# Patient Record
Sex: MALE | Race: WHITE | NOT HISPANIC OR LATINO | Employment: FULL TIME | RURAL
[De-identification: names, ages, dates, MRNs, and addresses within clinical notes are randomized per-mention and may not be internally consistent; named-entity substitution may affect disease eponyms.]

---

## 2018-12-14 ENCOUNTER — OFFICE VISIT (OUTPATIENT)
Dept: OTOLARYNGOLOGY | Facility: CLINIC | Age: 33
End: 2018-12-14

## 2018-12-14 VITALS — HEIGHT: 68 IN | OXYGEN SATURATION: 98 % | WEIGHT: 167 LBS | BODY MASS INDEX: 25.31 KG/M2

## 2018-12-14 DIAGNOSIS — J34.2 NASAL SEPTAL DEFORMITY: Primary | ICD-10-CM

## 2018-12-14 PROCEDURE — 31231 NASAL ENDOSCOPY DX: CPT | Performed by: OTOLARYNGOLOGY

## 2018-12-14 PROCEDURE — 99203 OFFICE O/P NEW LOW 30 MIN: CPT | Performed by: OTOLARYNGOLOGY

## 2018-12-14 RX ORDER — CITALOPRAM 20 MG/1
20 TABLET ORAL DAILY
Status: ON HOLD | COMMUNITY
End: 2022-03-16

## 2018-12-17 NOTE — PROGRESS NOTES
Subjective   Micah Antoine is a 33 y.o. male.     History of Present Illness   She is here for evaluation of chronic nasal obstruction.  States symptoms been present for greater than 10 years.  Seems to be worse on the right than the left.  Symptoms are also worse at night.  Doesn't have a lot of trouble with rhinorrhea.  No previous nasal surgery or injury.  Nothing makes this any better.      The following portions of the patient's history were reviewed and updated as appropriate: allergies, current medications, past family history, past medical history, past social history, past surgical history and problem list.      Micah Antoine reports that  has never smoked. he has never used smokeless tobacco.  Patient is not a tobacco user and has not been counseled for use of tobacco products    Family History   Problem Relation Age of Onset   • Cancer Mother    • Cancer Sister    • Cancer Maternal Grandmother    • Cancer Paternal Grandfather        No Known Allergies      Current Outpatient Medications:   •  citalopram (CeleXA) 20 MG tablet, Take 20 mg by mouth Daily., Disp: , Rfl:     No past medical history on file.  Denies hypertension, coronary artery disease, diabetes    Review of Systems   HENT: Positive for sore throat.    Respiratory: Positive for shortness of breath.    All other systems reviewed and are negative.          Objective   Physical Exam  General: Well-developed well-nourished male in no acute distress.  Alert and oriented ×3. Head: Normocephalic. Face: Symmetrical strength and appearance. PERRL. EOMI. Voice:Strong. Speech:Fluent  Ears: External ears no deformity, canals no discharge, tympanic membranes intact clear and mobile bilaterally.  Nose: Nares show no discharge mass polyp or purulence.  Nasal tip is rotated slightly to the left.  Nasal septum is markedly to the right obstructing essentially 100% of the nasal airway at the level of the inferior turbinate on the right.    Oral cavity: Lips and gums  without lesions.  Tongue and floor of mouth without lesions.  Parotid and submandibular ducts unobstructed.  No mucosal lesions on the buccal mucosa or vestibule of the mouth.  Pharynx: No erythema exudate mass or ulcer  Neck: No lymphadenopathy.  No thyromegaly.  Trachea and larynx midline.  No masses in the parotid or submandibular glands.    Nasal endoscopy is performed: Vinnie-Synephrine and Xylocaine are instilled the nares bilaterally.  0° scope is passed into each nostril.  The inferior, middle, and superior turbinates as well as nasal septum and nasopharynx are examined.  Pertinent findings include: Marked septal deformity to the right inferiorly impacting on the inferior turbinate and obstructing 100% of the nasal airway on the right.  Both nasal passages show no evidence of polyp or other obstructing mass.  Nasopharynx is without mass.    Assessment/Plan   Micah was seen today for deviated septum.    Diagnoses and all orders for this visit:    Nasal septal deformity      Plan: Offered to perform nasal septoplasty.  Explained the nature of the procedure to the patient in layman's terms including need for general anesthetic, and risks including bleeding, infection, poor healing, poor appearance, hole in the nasal septum, numbness of the front teeth and palate, as well as the possibility of failure to improve symptoms and possible need for medical therapy after surgery to maximize symptom improvement.  Proposed benefit would be improved nasal airflow.  The alternative would be observation.  Also discussed the possibility of seeing a surgeon who performs rhinoplasty if he wants to do something about the appearance of his nose, but he declines.  Patient voices understanding of all the above and wishes to proceed.  Upon review of the patient's referral from the [facility], the effective dates of his referral.  In on December 22.  I explained to the patient that he would need to obtain a new  authorization for additional treatment and once he is obtained this he can call to schedule his surgery and a preop history and physical.

## 2019-02-22 ENCOUNTER — OFFICE VISIT (OUTPATIENT)
Dept: OTOLARYNGOLOGY | Facility: CLINIC | Age: 34
End: 2019-02-22

## 2019-02-22 VITALS — BODY MASS INDEX: 25.91 KG/M2 | HEIGHT: 68 IN | WEIGHT: 171 LBS | OXYGEN SATURATION: 98 %

## 2019-02-22 DIAGNOSIS — J34.2 NASAL SEPTAL DEFORMITY: Primary | ICD-10-CM

## 2019-02-22 PROCEDURE — 99213 OFFICE O/P EST LOW 20 MIN: CPT | Performed by: OTOLARYNGOLOGY

## 2019-02-22 NOTE — PROGRESS NOTES
Subjective   Micah Antoine is a 33 y.o. male.     History of Present Illness     Patient was seen previously with long-standing nasal obstruction and a marked nasal septal deformity.  Septoplasty was discussed, but his [facility] authorization for care was going to  before this could be scheduled.  Returns today having had his period of coverage extended.  Continues to have daily symptoms of nasal obstruction.New patients  No purulent rhinorrhea.    The following portions of the patient's history were reviewed and updated as appropriate: allergies, current medications, past family history, past medical history, past social history, past surgical history and problem list.      Micah Antoine reports that  has never smoked. he has never used smokeless tobacco.  Patient is not a tobacco user and has not been counseled for use of tobacco products    Family History   Problem Relation Age of Onset   • Cancer Mother    • Cancer Sister    • Cancer Maternal Grandmother    • Cancer Paternal Grandfather        No Known Allergies      Current Outpatient Medications:   •  citalopram (CeleXA) 20 MG tablet, Take 20 mg by mouth Daily., Disp: , Rfl:     No past medical history on file.  Denies hypertension, coronary artery disease, diabetes    Review of Systems   Constitutional: Negative for fever.   HENT: Positive for congestion.    Respiratory: Negative for cough.            Objective   Physical Exam  General: Well-developed well-nourished male in no acute distress.  Alert and oriented x3. Head: Normocephalic. Face: Symmetrical strength and appearance. PERRL. EOMI. Voice:Strong. Speech:Fluent  Ears: External ears no deformity, canals no discharge, tympanic membranes intact clear and mobile bilaterally.  Nose: Nares show no discharge mass polyp or purulence.  Boggy mucosa is present.  Tip slightly rotated to the left septum: Markedly angulated deformity to the right obstructing essentially 100% of the nasal airway at the level of the inferior  turbinate on the right  Oral cavity: Lips and gums without lesions.  Tongue and floor of mouth without lesions.  Parotid and submandibular ducts unobstructed.  No mucosal lesions on the buccal mucosa or vestibule of the mouth.  Pharynx: No erythema exudate mass or ulcer  Neck: No lymphadenopathy.  No thyromegaly.  Trachea and larynx midline.  No masses in the parotid or submandibular glands.  Chest: Clear.  Heart: Regular.  Abdomen: Benign.        Assessment/Plan   Micah was seen today for follow-up.    Diagnoses and all orders for this visit:    Nasal septal deformity      Plan: Offered to perform nasal septoplasty.  Explained the nature of the procedure to the patient in layman's terms including need for general anesthetic, and risks including bleeding, infection, poor healing, poor appearance, hole in the nasal septum, numbness of the front teeth and palate, as well as the possibility of failure to improve symptoms and possible need for medical therapy after surgery to maximize symptom improvement.  Proposed benefit would be improved nasal airflow.  The alternative would be observation.  Patient voices understanding of all the above and wishes to proceed.  This is scheduled.

## 2019-02-25 ENCOUNTER — PREP FOR SURGERY (OUTPATIENT)
Dept: OTHER | Facility: HOSPITAL | Age: 34
End: 2019-02-25

## 2019-02-25 DIAGNOSIS — J34.2 DEVIATED NASAL SEPTUM: Primary | ICD-10-CM

## 2019-02-25 RX ORDER — OXYMETAZOLINE HYDROCHLORIDE 0.05 G/100ML
3 SPRAY NASAL
Status: CANCELLED | OUTPATIENT
Start: 2019-03-18 | End: 2019-03-20

## 2022-01-21 ENCOUNTER — OFFICE VISIT (OUTPATIENT)
Dept: OTOLARYNGOLOGY | Facility: CLINIC | Age: 37
End: 2022-01-21

## 2022-01-21 VITALS — HEIGHT: 68 IN | OXYGEN SATURATION: 98 % | WEIGHT: 177 LBS | HEART RATE: 68 BPM | BODY MASS INDEX: 26.83 KG/M2

## 2022-01-21 DIAGNOSIS — J34.2 NASAL SEPTAL DEFORMITY: Primary | ICD-10-CM

## 2022-01-21 PROCEDURE — 99214 OFFICE O/P EST MOD 30 MIN: CPT | Performed by: OTOLARYNGOLOGY

## 2022-01-21 RX ORDER — OXYMETAZOLINE HYDROCHLORIDE 0.05 G/100ML
2 SPRAY NASAL
Status: CANCELLED | OUTPATIENT
Start: 2022-03-16 | End: 2022-03-19

## 2022-01-21 NOTE — PROGRESS NOTES
Subjective   Micah Antoine is a 36 y.o. male.     History of Present Illness   Patient is known to me from previous evaluation back in 2019. Has longstanding nasal obstruction and marked nasal septal deformity. Septoplasty was discussed and scheduled but for reasons not entirely clear he never was able to proceed with surgery. Returns today stating he continues to have daily symptoms of nasal obstruction and would like to be reevaluated for surgery. Is not having any purulent rhinorrhea. Previously had nasal endoscopy that did not reveal any additional pathology beyond the septal deformity that was contributing to his symptoms.      The following portions of the patient's history were reviewed and updated as appropriate: allergies, current medications, past family history, past medical history, past social history, past surgical history and problem list.      Micah Antoine reports that he has never smoked. He has never used smokeless tobacco.  Patient is not a tobacco user and has not been counseled for use of tobacco products    Family History   Problem Relation Age of Onset   • Cancer Mother    • Cancer Sister    • Cancer Maternal Grandmother    • Cancer Paternal Grandfather        No Known Allergies      Current Outpatient Medications:   •  citalopram (CeleXA) 20 MG tablet, Take 20 mg by mouth Daily., Disp: , Rfl:     No past medical history on file.    Past Surgical History:   Procedure Laterality Date   • EYE SURGERY           Review of Systems   Constitutional: Negative for fever.   HENT: Positive for congestion.            Objective   Physical Exam  Ears: External ears no deformity, canals no discharge, tympanic membranes intact clear and mobile bilaterally.  Nares: Markedly angulated septal deformity to the right impacting on the inferior turbinate and obstructing 100% of the nasal airway at this level. No polyp or purulence seen on anterior rhinoscopy.  Oral cavity: No masses or lesions  Pharynx: No erythema or  exudate  Neck: No adenopathy or mass        Assessment/Plan   Diagnoses and all orders for this visit:    1. Nasal septal deformity (Primary)  -     Case Request; Standing  -     COVID PRE-OP / PRE-PROCEDURE SCREENING ORDER (NO ISOLATION) - Swab, Nasopharynx; Future  -     Case Request    Other orders  -     Follow Anesthesia Guidelines / Standing Orders; Future  -     Obtain Informed Consent; Future      Plan: Offered to perform nasal septoplasty.  Explained the nature of the procedure to the patient in layman's terms including need for general anesthetic, and risks including bleeding, infection, poor healing, poor appearance, hole in the nasal septum, numbness of the front teeth and palate, as well as the possibility of failure to improve symptoms and possible need for medical therapy after surgery to maximize symptom improvement.  Proposed benefit would be improved nasal airflow.  The alternative would be observation.  Patient voices understanding of all the above and wishes to proceed.  This is scheduled.

## 2022-02-25 ENCOUNTER — APPOINTMENT (OUTPATIENT)
Dept: PREADMISSION TESTING | Facility: HOSPITAL | Age: 37
End: 2022-02-25

## 2022-03-14 ENCOUNTER — PRE-ADMISSION TESTING (OUTPATIENT)
Dept: PREADMISSION TESTING | Facility: HOSPITAL | Age: 37
End: 2022-03-14

## 2022-03-14 ENCOUNTER — LAB (OUTPATIENT)
Dept: LAB | Facility: HOSPITAL | Age: 37
End: 2022-03-14

## 2022-03-14 VITALS
OXYGEN SATURATION: 98 % | HEART RATE: 78 BPM | RESPIRATION RATE: 16 BRPM | DIASTOLIC BLOOD PRESSURE: 74 MMHG | WEIGHT: 176 LBS | HEIGHT: 68 IN | BODY MASS INDEX: 26.67 KG/M2 | SYSTOLIC BLOOD PRESSURE: 136 MMHG

## 2022-03-14 DIAGNOSIS — J34.2 NASAL SEPTAL DEFORMITY: ICD-10-CM

## 2022-03-14 LAB — SARS-COV-2 N GENE RESP QL NAA+PROBE: NOT DETECTED

## 2022-03-14 PROCEDURE — 87635 SARS-COV-2 COVID-19 AMP PRB: CPT

## 2022-03-14 PROCEDURE — C9803 HOPD COVID-19 SPEC COLLECT: HCPCS

## 2022-03-15 ENCOUNTER — ANESTHESIA EVENT (OUTPATIENT)
Dept: PERIOP | Facility: HOSPITAL | Age: 37
End: 2022-03-15

## 2022-03-16 ENCOUNTER — ANESTHESIA (OUTPATIENT)
Dept: PERIOP | Facility: HOSPITAL | Age: 37
End: 2022-03-16

## 2022-03-16 ENCOUNTER — HOSPITAL ENCOUNTER (OUTPATIENT)
Facility: HOSPITAL | Age: 37
Setting detail: HOSPITAL OUTPATIENT SURGERY
Discharge: HOME | End: 2022-03-16
Attending: OTOLARYNGOLOGY | Admitting: OTOLARYNGOLOGY

## 2022-03-16 VITALS
HEIGHT: 68 IN | WEIGHT: 175.93 LBS | SYSTOLIC BLOOD PRESSURE: 158 MMHG | DIASTOLIC BLOOD PRESSURE: 98 MMHG | HEART RATE: 70 BPM | TEMPERATURE: 96.9 F | BODY MASS INDEX: 26.66 KG/M2 | RESPIRATION RATE: 18 BRPM | OXYGEN SATURATION: 100 %

## 2022-03-16 DIAGNOSIS — J34.2 NASAL SEPTAL DEFORMITY: ICD-10-CM

## 2022-03-16 PROCEDURE — 88311 DECALCIFY TISSUE: CPT

## 2022-03-16 PROCEDURE — 25010000002 FENTANYL CITRATE (PF) 50 MCG/ML SOLUTION: Performed by: NURSE ANESTHETIST, CERTIFIED REGISTERED

## 2022-03-16 PROCEDURE — 25010000002 SUCCINYLCHOLINE PER 20 MG: Performed by: NURSE ANESTHETIST, CERTIFIED REGISTERED

## 2022-03-16 PROCEDURE — 25010000002 MIDAZOLAM PER 1 MG: Performed by: NURSE ANESTHETIST, CERTIFIED REGISTERED

## 2022-03-16 PROCEDURE — 25010000002 DEXAMETHASONE PER 1 MG: Performed by: NURSE ANESTHETIST, CERTIFIED REGISTERED

## 2022-03-16 PROCEDURE — 25010000002 PROPOFOL 10 MG/ML EMULSION: Performed by: NURSE ANESTHETIST, CERTIFIED REGISTERED

## 2022-03-16 PROCEDURE — 88304 TISSUE EXAM BY PATHOLOGIST: CPT

## 2022-03-16 PROCEDURE — 25010000002 ONDANSETRON PER 1 MG: Performed by: NURSE ANESTHETIST, CERTIFIED REGISTERED

## 2022-03-16 PROCEDURE — 30520 REPAIR OF NASAL SEPTUM: CPT | Performed by: OTOLARYNGOLOGY

## 2022-03-16 RX ORDER — MULTIPLE VITAMINS W/ MINERALS TAB 9MG-400MCG
1 TAB ORAL DAILY
COMMUNITY

## 2022-03-16 RX ORDER — LIDOCAINE HYDROCHLORIDE AND EPINEPHRINE BITARTRATE 20; .01 MG/ML; MG/ML
INJECTION, SOLUTION SUBCUTANEOUS AS NEEDED
Status: DISCONTINUED | OUTPATIENT
Start: 2022-03-16 | End: 2022-03-16 | Stop reason: HOSPADM

## 2022-03-16 RX ORDER — NALOXONE HCL 0.4 MG/ML
0.4 VIAL (ML) INJECTION AS NEEDED
Status: DISCONTINUED | OUTPATIENT
Start: 2022-03-16 | End: 2022-03-16 | Stop reason: HOSPADM

## 2022-03-16 RX ORDER — PROMETHAZINE HYDROCHLORIDE 25 MG/1
25 TABLET ORAL ONCE AS NEEDED
Status: DISCONTINUED | OUTPATIENT
Start: 2022-03-16 | End: 2022-03-16 | Stop reason: HOSPADM

## 2022-03-16 RX ORDER — OXYMETAZOLINE HYDROCHLORIDE 0.05 G/100ML
2 SPRAY NASAL
Status: DISCONTINUED | OUTPATIENT
Start: 2022-03-16 | End: 2022-03-16 | Stop reason: HOSPADM

## 2022-03-16 RX ORDER — MIDAZOLAM HYDROCHLORIDE 1 MG/ML
INJECTION INTRAMUSCULAR; INTRAVENOUS AS NEEDED
Status: DISCONTINUED | OUTPATIENT
Start: 2022-03-16 | End: 2022-03-16 | Stop reason: SURG

## 2022-03-16 RX ORDER — SODIUM CHLORIDE, SODIUM GLUCONATE, SODIUM ACETATE, POTASSIUM CHLORIDE AND MAGNESIUM CHLORIDE 526; 502; 368; 37; 30 MG/100ML; MG/100ML; MG/100ML; MG/100ML; MG/100ML
1000 INJECTION, SOLUTION INTRAVENOUS CONTINUOUS PRN
Status: DISCONTINUED | OUTPATIENT
Start: 2022-03-16 | End: 2022-03-16 | Stop reason: HOSPADM

## 2022-03-16 RX ORDER — PROMETHAZINE HYDROCHLORIDE 25 MG/1
25 SUPPOSITORY RECTAL ONCE AS NEEDED
Status: DISCONTINUED | OUTPATIENT
Start: 2022-03-16 | End: 2022-03-16 | Stop reason: HOSPADM

## 2022-03-16 RX ORDER — ACETAMINOPHEN 650 MG/1
650 SUPPOSITORY RECTAL ONCE AS NEEDED
Status: DISCONTINUED | OUTPATIENT
Start: 2022-03-16 | End: 2022-03-16 | Stop reason: HOSPADM

## 2022-03-16 RX ORDER — ROCURONIUM BROMIDE 10 MG/ML
INJECTION, SOLUTION INTRAVENOUS AS NEEDED
Status: DISCONTINUED | OUTPATIENT
Start: 2022-03-16 | End: 2022-03-16 | Stop reason: SURG

## 2022-03-16 RX ORDER — ONDANSETRON 2 MG/ML
INJECTION INTRAMUSCULAR; INTRAVENOUS AS NEEDED
Status: DISCONTINUED | OUTPATIENT
Start: 2022-03-16 | End: 2022-03-16 | Stop reason: SURG

## 2022-03-16 RX ORDER — SUCCINYLCHOLINE CHLORIDE 20 MG/ML
INJECTION INTRAMUSCULAR; INTRAVENOUS AS NEEDED
Status: DISCONTINUED | OUTPATIENT
Start: 2022-03-16 | End: 2022-03-16 | Stop reason: SURG

## 2022-03-16 RX ORDER — ONDANSETRON 2 MG/ML
4 INJECTION INTRAMUSCULAR; INTRAVENOUS ONCE AS NEEDED
Status: DISCONTINUED | OUTPATIENT
Start: 2022-03-16 | End: 2022-03-16 | Stop reason: HOSPADM

## 2022-03-16 RX ORDER — OXYCODONE HYDROCHLORIDE AND ACETAMINOPHEN 5; 325 MG/1; MG/1
1 TABLET ORAL ONCE AS NEEDED
Status: DISCONTINUED | OUTPATIENT
Start: 2022-03-16 | End: 2022-03-16 | Stop reason: HOSPADM

## 2022-03-16 RX ORDER — DEXAMETHASONE SODIUM PHOSPHATE 4 MG/ML
INJECTION, SOLUTION INTRA-ARTICULAR; INTRALESIONAL; INTRAMUSCULAR; INTRAVENOUS; SOFT TISSUE AS NEEDED
Status: DISCONTINUED | OUTPATIENT
Start: 2022-03-16 | End: 2022-03-16 | Stop reason: SURG

## 2022-03-16 RX ORDER — FLUMAZENIL 0.1 MG/ML
0.2 INJECTION INTRAVENOUS AS NEEDED
Status: DISCONTINUED | OUTPATIENT
Start: 2022-03-16 | End: 2022-03-16 | Stop reason: HOSPADM

## 2022-03-16 RX ORDER — OXYMETAZOLINE HYDROCHLORIDE 0.05 G/100ML
SPRAY NASAL AS NEEDED
Status: DISCONTINUED | OUTPATIENT
Start: 2022-03-16 | End: 2022-03-16 | Stop reason: HOSPADM

## 2022-03-16 RX ORDER — DIPHENHYDRAMINE HYDROCHLORIDE 50 MG/ML
12.5 INJECTION INTRAMUSCULAR; INTRAVENOUS
Status: DISCONTINUED | OUTPATIENT
Start: 2022-03-16 | End: 2022-03-16 | Stop reason: HOSPADM

## 2022-03-16 RX ORDER — ONDANSETRON 4 MG/1
4 TABLET, ORALLY DISINTEGRATING ORAL EVERY 8 HOURS PRN
Qty: 10 TABLET | Refills: 1 | Status: SHIPPED | OUTPATIENT
Start: 2022-03-16 | End: 2022-03-29

## 2022-03-16 RX ORDER — EPHEDRINE SULFATE 50 MG/ML
5 INJECTION, SOLUTION INTRAVENOUS ONCE AS NEEDED
Status: DISCONTINUED | OUTPATIENT
Start: 2022-03-16 | End: 2022-03-16 | Stop reason: HOSPADM

## 2022-03-16 RX ORDER — CEFUROXIME AXETIL 250 MG/1
250 TABLET ORAL 2 TIMES DAILY
Qty: 14 TABLET | Refills: 0 | Status: SHIPPED | OUTPATIENT
Start: 2022-03-16 | End: 2022-03-29

## 2022-03-16 RX ORDER — PROPOFOL 10 MG/ML
VIAL (ML) INTRAVENOUS AS NEEDED
Status: DISCONTINUED | OUTPATIENT
Start: 2022-03-16 | End: 2022-03-16 | Stop reason: SURG

## 2022-03-16 RX ORDER — SCOLOPAMINE TRANSDERMAL SYSTEM 1 MG/1
1 PATCH, EXTENDED RELEASE TRANSDERMAL CONTINUOUS
Status: DISCONTINUED | OUTPATIENT
Start: 2022-03-16 | End: 2022-03-16 | Stop reason: HOSPADM

## 2022-03-16 RX ORDER — LIDOCAINE HYDROCHLORIDE 20 MG/ML
INJECTION, SOLUTION INFILTRATION; PERINEURAL AS NEEDED
Status: DISCONTINUED | OUTPATIENT
Start: 2022-03-16 | End: 2022-03-16 | Stop reason: SURG

## 2022-03-16 RX ORDER — MEPERIDINE HYDROCHLORIDE 25 MG/ML
12.5 INJECTION INTRAMUSCULAR; INTRAVENOUS; SUBCUTANEOUS
Status: DISCONTINUED | OUTPATIENT
Start: 2022-03-16 | End: 2022-03-16 | Stop reason: HOSPADM

## 2022-03-16 RX ORDER — OXYCODONE HYDROCHLORIDE AND ACETAMINOPHEN 5; 325 MG/1; MG/1
1 TABLET ORAL EVERY 6 HOURS PRN
Qty: 30 TABLET | Refills: 0 | Status: SHIPPED | OUTPATIENT
Start: 2022-03-16 | End: 2022-03-29

## 2022-03-16 RX ORDER — ACETAMINOPHEN 325 MG/1
650 TABLET ORAL ONCE AS NEEDED
Status: DISCONTINUED | OUTPATIENT
Start: 2022-03-16 | End: 2022-03-16 | Stop reason: HOSPADM

## 2022-03-16 RX ORDER — FENTANYL CITRATE 50 UG/ML
INJECTION, SOLUTION INTRAMUSCULAR; INTRAVENOUS AS NEEDED
Status: DISCONTINUED | OUTPATIENT
Start: 2022-03-16 | End: 2022-03-16 | Stop reason: SURG

## 2022-03-16 RX ADMIN — LIDOCAINE HYDROCHLORIDE 80 MG: 20 INJECTION, SOLUTION INFILTRATION; PERINEURAL at 11:19

## 2022-03-16 RX ADMIN — SODIUM CHLORIDE, SODIUM GLUCONATE, SODIUM ACETATE, POTASSIUM CHLORIDE AND MAGNESIUM CHLORIDE 1000 ML: 526; 502; 368; 37; 30 INJECTION, SOLUTION INTRAVENOUS at 09:34

## 2022-03-16 RX ADMIN — MIDAZOLAM HYDROCHLORIDE 2 MG: 1 INJECTION, SOLUTION INTRAMUSCULAR; INTRAVENOUS at 11:15

## 2022-03-16 RX ADMIN — SODIUM CHLORIDE, SODIUM GLUCONATE, SODIUM ACETATE, POTASSIUM CHLORIDE AND MAGNESIUM CHLORIDE 1000 ML: 526; 502; 368; 37; 30 INJECTION, SOLUTION INTRAVENOUS at 13:06

## 2022-03-16 RX ADMIN — ROCURONIUM BROMIDE 5 MG: 10 INJECTION INTRAVENOUS at 11:19

## 2022-03-16 RX ADMIN — SCOLOPAMINE TRANSDERMAL SYSTEM 1 PATCH: 1 PATCH, EXTENDED RELEASE TRANSDERMAL at 09:35

## 2022-03-16 RX ADMIN — DEXAMETHASONE SODIUM PHOSPHATE 4 MG: 4 INJECTION, SOLUTION INTRAMUSCULAR; INTRAVENOUS at 11:32

## 2022-03-16 RX ADMIN — SUCCINYLCHOLINE CHLORIDE 140 MG: 20 INJECTION, SOLUTION INTRAMUSCULAR; INTRAVENOUS at 11:23

## 2022-03-16 RX ADMIN — FENTANYL CITRATE 50 MCG: 50 INJECTION INTRAMUSCULAR; INTRAVENOUS at 12:36

## 2022-03-16 RX ADMIN — PROPOFOL 200 MG: 10 INJECTION, EMULSION INTRAVENOUS at 11:19

## 2022-03-16 RX ADMIN — OXYMETAZOLINE HYDROCHLORIDE 2 SPRAY: 0.05 SPRAY NASAL at 09:35

## 2022-03-16 RX ADMIN — ONDANSETRON 4 MG: 2 INJECTION INTRAMUSCULAR; INTRAVENOUS at 12:18

## 2022-03-16 RX ADMIN — SODIUM CHLORIDE, SODIUM GLUCONATE, SODIUM ACETATE, POTASSIUM CHLORIDE AND MAGNESIUM CHLORIDE: 526; 502; 368; 37; 30 INJECTION, SOLUTION INTRAVENOUS at 11:31

## 2022-03-16 RX ADMIN — FENTANYL CITRATE 50 MCG: 50 INJECTION INTRAMUSCULAR; INTRAVENOUS at 11:17

## 2022-03-16 NOTE — OP NOTE
PREOPERATIVE DIAGNOSIS: Nasal septal deformity.     POSTOPERATIVE DIAGNOSIS: Nasal septal deformity.     PROCEDURE PERFORMED: Nasal septoplasty.     SURGEON: Lazaro Franco MD    ANESTHESIA: General endotracheal.     ESTIMATED BLOOD LOSS: Minimal.     FLUIDS: Crystalloid.     SPECIMENS: Septal fragments.     COMPLICATIONS: None.     INDICATIONS FOR PROCEDURE: This is a 36-year-old male with symptoms of romana obstruction and a markedly angulated nasal septal deformity.     FINDINGS: Angulated septal deformity to the right with significant fibrosis.     DESCRIPTION OF PROCEDURE: The patient was taken to the operating room and placed in the supine position. After the satisfactory induction of general endotracheal anesthesia, the head of the bed was turned and Afrin-soaked pledgets were placed in the nares bilaterally. The face was then draped in the usual fashion. The Afrin-soaked pledgets were removed and 2% Xylocaine with epinephrine was infiltrated into the mucosal flaps of the septum bilaterally. A Hans incision was made on the left and the mucoperichondrial flap was elevated back beyond the bony cartilaginous junction using a [place] elevator. An incision was made in the septal cartilage posterior to the mucosal incision. A contralateral mucoperichondrial flap was eventually able to be elevated but there was significant fibrosis that made this somewhat difficult and resulted in several mucosal tears of the right-sided septal mucosa. The septal incision was then extended inferiorly down to the nasal spine. Septal cartilage was elevated off the nasal spine from anterior to posterior back to the bony cartilaginous junction. Bony cartilaginous junction was  from inferior to superior and the cartilaginous incision was brought anteriorly to join the previous incision, allowing removal of a deflected piece of quadrangular cartilage while leaving a continuous dorsal and columellar strut in place. Mucosal flaps  were then elevated off the bony septum and the nasal spine. Angulated areas were resected using Radha forceps. Anteriorly there was a bony angulation of the spine to the right that was taken down using a V-shaped chisel. At this point both nares were widely patent as evidenced by passing a large silver nasal speculum through each naris. Previously resected cartilage as trimmed, morcellized, and replaced between the mucosal flaps of the septum. The left septal mucosa was entirely intact, other than the initial incision. The septal cartilage covered the tears in the right-sided septal mucosa. The [place] incision was closed with interrupted 5-0 Vicryl suture and the septum was inspected and there were noted to be no through-and-through defects. The mucosal flaps of the septum were then reapproximated to one another using running through-and-through mattress suture of 4-0 plain gut including transfixing the reimplanted cartilage. The septal mucosa was again inspected and again there were noted to be no through-and-through defects. Mupirocin-coated Telfa pads were placed in the nares bilaterally and the procedure was terminated. The patient tolerated the procedure well and went to the recovery room in satisfactory condition.

## 2022-03-16 NOTE — ANESTHESIA POSTPROCEDURE EVALUATION
Patient: Micah Antoine    Procedure Summary     Date: 03/16/22 Room / Location: [facility] OR 08 / [facility] OR    Anesthesia Start: 1115 Anesthesia Stop:     Procedure: NASAL SEPTOPLASTY (N/A Nose) Diagnosis:       Nasal septal deformity      (Nasal septal deformity [J34.2])    Surgeons: Lazaro Franco MD Provider: Abner Joya MD    Anesthesia Type: general ASA Status: 1          Anesthesia Type: general    Vitals  No vitals data found for the desired time range.          Post Anesthesia Care and Evaluation    Patient location during evaluation: ICU  Patient participation: complete - patient cannot participate  Level of consciousness: sleepy but conscious  Pain score: 0  Pain management: adequate  Airway patency: patent  Anesthetic complications: No anesthetic complications  PONV Status: none  Cardiovascular status: acceptable  Respiratory status: acceptable, face mask and spontaneous ventilation  Hydration status: acceptable    Comments: /98, sat 98%, HR 68, resp 16

## 2022-03-16 NOTE — ANESTHESIA PREPROCEDURE EVALUATION
Anesthesia Evaluation     Patient summary reviewed and Nursing notes reviewed   history of anesthetic complications (Scoapalamine patch ordered pre-op.): PONV  NPO Solid Status: > 8 hours  NPO Liquid Status: > 8 hours           Airway   Mallampati: I  TM distance: >3 FB  Neck ROM: full  possible difficult intubation  Dental    (+) poor dentation    Pulmonary - negative pulmonary ROS and normal exam    breath sounds clear to auscultation  (-) not a smoker  Cardiovascular - negative cardio ROS and normal exam    Rhythm: regular  Rate: normal    (-) angina, DHILLON, murmur      Neuro/Psych- negative ROS  GI/Hepatic/Renal/Endo - negative ROS     Musculoskeletal (-) negative ROS    Abdominal    Substance History - negative use     OB/GYN negative ob/gyn ROS         Other - negative ROS                       Anesthesia Plan    ASA 1     general     intravenous induction     Anesthetic plan, all risks, benefits, and alternatives have been provided, discussed and informed consent has been obtained with: patient.        CODE STATUS:

## 2022-03-16 NOTE — ANESTHESIA PROCEDURE NOTES
Airway  Urgency: elective    Date/Time: 3/16/2022 11:24 AM  Airway not difficult    General Information and Staff    Patient location during procedure: OR  CRNA: Bibi Rodriguez CRNA    Indications and Patient Condition  Indications for airway management: airway protection    Preoxygenated: yes  MILS not maintained throughout  Mask difficulty assessment: 1 - vent by mask    Final Airway Details  Final airway type: endotracheal airway      Successful airway: ETT and CHRISTELLE tube  Cuffed: yes   Successful intubation technique: direct laryngoscopy  Facilitating devices/methods: intubating stylet and cricoid pressure  Endotracheal tube insertion site: oral  Blade: Birdie  Blade size: 3  ETT size (mm): 7.5  Cormack-Lehane Classification: grade IIa - partial view of glottis  Placement verified by: chest auscultation and capnometry   Measured from: lips  ETT/EBT  to lips (cm): 21  Number of attempts at approach: 1  Assessment: lips, teeth, and gum same as pre-op and atraumatic intubation    Additional Comments  Intubation by Ariella Cronin SRNA

## 2022-03-16 NOTE — BRIEF OP NOTE
SEPTOPLASTY  Progress Note    Micah Toro Arash  3/16/2022    Pre-op Diagnosis:   Nasal septal deformity [J34.2]       Post-Op Diagnosis Codes:     * Nasal septal deformity [J34.2]    Procedure/CPT® Codes:        Procedure(s):  NASAL SEPTOPLASTY    Surgeon(s):  Lazaro Franco MD    Anesthesia: General    Staff:   Circulator: Dalila Pratt RN; Miranda Valle RN  Scrub Person: Lesia Nicole; Karen Tellez  Assistant: Flori Whyte  Assistant: Flori Whyte      Estimated Blood Loss: minimal    Urine Voided: * No values recorded between 3/16/2022 11:14 AM and 3/16/2022 12:18 PM *    Specimens:                Specimens     ID Source Type Tests Collected By Collected At Frozen?    A Nose Tissue · TISSUE PATHOLOGY EXAM   Lazaro Franco MD 3/16/22 1216 No    Description: septal fragments    This specimen was not marked as sent.                Drains: * No LDAs found *    Findings: Markedly angulated septal deformity to the right with significant fibrosis    Complications: None      Lazaro Franco MD     Date: 3/16/2022  Time: 12:24 CDT

## 2022-03-16 NOTE — H&P
Micah Antoine is a 36 y.o. male.      History of Present Illness   Patient is known to me from previous evaluation back in 2019. Has longstanding nasal obstruction and marked nasal septal deformity. Septoplasty was discussed and scheduled but for reasons not entirely clear he never was able to proceed with surgery. Returns today stating he continues to have daily symptoms of nasal obstruction and would like to be reevaluated for surgery. Is not having any purulent rhinorrhea. Previously had nasal endoscopy that did not reveal any additional pathology beyond the septal deformity that was contributing to his symptoms.        The following portions of the patient's history were reviewed and updated as appropriate: allergies, current medications, past family history, past medical history, past social history, past surgical history and problem list.        Micah Antoine reports that he has never smoked. He has never used smokeless tobacco.  Patient is not a tobacco user and has not been counseled for use of tobacco products           Family History   Problem Relation Age of Onset   • Cancer Mother     • Cancer Sister     • Cancer Maternal Grandmother     • Cancer Paternal Grandfather           No Known Allergies        Current Outpatient Medications:   •  citalopram (CeleXA) 20 MG tablet, Take 20 mg by mouth Daily., Disp: , Rfl:      Medical History   No past medical history on file.        Surgical History         Past Surgical History:   Procedure Laterality Date   • EYE SURGERY                   Review of Systems   Constitutional: Negative for fever.   HENT: Positive for congestion.                      Objective      Physical Exam  Ears: External ears no deformity, canals no discharge, tympanic membranes intact clear and mobile bilaterally.  Nares: Markedly angulated septal deformity to the right impacting on the inferior turbinate and obstructing 100% of the nasal airway at this level. No polyp or purulence seen on anterior  rhinoscopy.  Oral cavity: No masses or lesions  Pharynx: No erythema or exudate  Neck: No adenopathy or mass                 Assessment/Plan      Diagnoses and all orders for this visit:     1. Nasal septal deformity (Primary)  -     Case Request; Standing  -     COVID PRE-OP / PRE-PROCEDURE SCREENING ORDER (NO ISOLATION) - Swab, Nasopharynx; Future  -     Case Request     Other orders  -     Follow Anesthesia Guidelines / Standing Orders; Future  -     Obtain Informed Consent; Future        Plan: Offered to perform nasal septoplasty.  Explained the nature of the procedure to the patient in layman's terms including need for general anesthetic, and risks including bleeding, infection, poor healing, poor appearance, hole in the nasal septum, numbness of the front teeth and palate, as well as the possibility of failure to improve symptoms and possible need for medical therapy after surgery to maximize symptom improvement.  Proposed benefit would be improved nasal airflow.  The alternative would be observation.  Patient voices understanding of all the above and wishes to proceed.  This is scheduled.    Update 3/16/22 no change in exam or plan

## 2022-03-17 ENCOUNTER — OFFICE VISIT (OUTPATIENT)
Dept: OTOLARYNGOLOGY | Facility: CLINIC | Age: 37
End: 2022-03-17

## 2022-03-17 VITALS — HEIGHT: 68 IN | TEMPERATURE: 98.2 F | WEIGHT: 175 LBS | BODY MASS INDEX: 26.52 KG/M2

## 2022-03-17 DIAGNOSIS — Z48.00 ENCOUNTER FOR REMOVAL OF NASAL PACKING: Primary | ICD-10-CM

## 2022-03-17 PROCEDURE — 99024 POSTOP FOLLOW-UP VISIT: CPT | Performed by: OTOLARYNGOLOGY

## 2022-03-24 LAB
LAB AP CASE REPORT: NORMAL
PATH REPORT.FINAL DX SPEC: NORMAL

## 2022-03-29 ENCOUNTER — OFFICE VISIT (OUTPATIENT)
Dept: OTOLARYNGOLOGY | Facility: CLINIC | Age: 37
End: 2022-03-29

## 2022-03-29 VITALS — HEIGHT: 68 IN | OXYGEN SATURATION: 96 % | BODY MASS INDEX: 26.52 KG/M2 | WEIGHT: 175 LBS

## 2022-03-29 DIAGNOSIS — Z48.813 AFTERCARE FOLLOWING SURGERY OF THE RESPIRATORY SYSTEM: Primary | ICD-10-CM

## 2022-03-29 PROCEDURE — 99024 POSTOP FOLLOW-UP VISIT: CPT | Performed by: OTOLARYNGOLOGY

## 2022-03-29 NOTE — PROGRESS NOTES
Subjective   Micah Antoine is a 36 y.o. male.       History of Present Illness   Patient is status post nasal septoplasty performed on 3/16/2022.  Reports he is not having any significant bleeding.  Says he still cannot tell a whole lot of improvement in his nasal bleeding at this point.  Has been using nasal saline spray.      The following portions of the patient's history were reviewed and updated as appropriate: allergies, current medications, past family history, past medical history, past social history, past surgical history and problem list.     reports that he has never smoked. He has never used smokeless tobacco. He reports current alcohol use. He reports that he does not use drugs.   Patient is not a tobacco user and has not been counseled for use of tobacco products      Review of Systems        Objective   Physical Exam  Nares: Septal incision is intact.  There is a good bit of crusting on the right septum.  Vinnie-Synephrine and Xylocaine were instilled in the nares.  Using a headlight and alligator forceps the crusting was removed from the right and sutures were trimmed from the incision on the left anterior septum.  The septum appears intact.  Airflow is significantly improved after removal of the crust.      Assessment/Plan   Diagnoses and all orders for this visit:    1. Aftercare following surgery of the respiratory system (Primary)      Plan: Crusting removed as described above.  Told the patient to continue using nasal saline spray.  He may now blow his nose gently and otherwise has no restriction on activities, but should have avoid manipulation/digital trauma.  Return in 2 months, call sooner for problems.

## 2022-06-17 ENCOUNTER — OFFICE VISIT (OUTPATIENT)
Dept: OTOLARYNGOLOGY | Facility: CLINIC | Age: 37
End: 2022-06-17

## 2022-06-17 VITALS — BODY MASS INDEX: 26.1 KG/M2 | HEIGHT: 68 IN | WEIGHT: 172.2 LBS | OXYGEN SATURATION: 99 %

## 2022-06-17 DIAGNOSIS — Z48.813 AFTERCARE FOLLOWING SURGERY OF THE RESPIRATORY SYSTEM: ICD-10-CM

## 2022-06-17 DIAGNOSIS — J31.0 CHRONIC RHINITIS: Primary | ICD-10-CM

## 2022-06-17 PROCEDURE — 99213 OFFICE O/P EST LOW 20 MIN: CPT | Performed by: OTOLARYNGOLOGY

## 2022-06-17 NOTE — PROGRESS NOTES
Subjective   Micah Antoine is a 37 y.o. male.       History of Present Illness   Patient is status post nasal septoplasty performed on 3/16/2022.  Initially was still having a good bit of congestion but this improved after removing crusting and sutures from his nose.  Returns today stating that he is noticeably improved as far as nasal airflow.  Is having some intermittent allergy symptoms that he treats with over-the-counter medicine and he is satisfied with his level of symptom control.      The following portions of the patient's history were reviewed and updated as appropriate: allergies, current medications, past family history, past medical history, past social history, past surgical history and problem list.     reports that he has never smoked. He has never used smokeless tobacco. He reports current alcohol use. He reports that he does not use drugs.   Patient is not a tobacco user and has not been counseled for use of tobacco products      Review of Systems        Objective   Physical Exam  Septum is well-healed and in the midline with no crusting or purulence.  There is good airflow bilaterally.         Assessment and Plan   Diagnoses and all orders for this visit:    1. Chronic rhinitis (Primary)    2. Aftercare following surgery of the respiratory system             Plan: Patient feels like he does not need a prescription medicine for his chronic rhinitis.  Told him to use saline nose spray as needed for any congestion or crusting and follow-up with me as needed for further problems.

## (undated) DEVICE — SUT VIC 5/0 P3 18IN UD VCP493G

## (undated) DEVICE — STERILE POLYISOPRENE POWDER-FREE SURGICAL GLOVES WITH EMOLLIENT COATING: Brand: PROTEXIS

## (undated) DEVICE — SOL IRR NACL 0.9PCT BT 1000ML

## (undated) DEVICE — PK ENT LF 60

## (undated) DEVICE — SUT SILK 2/0 FS BLK 18IN 685G

## (undated) DEVICE — DRSNG TELFA PAD NONADH STR 1S 3X8IN

## (undated) DEVICE — GLV SURG SENSICARE POLYISPRN W/ALOE PF LF 6.5 GRN STRL

## (undated) DEVICE — CODMAN® SURGICAL PATTIES 1/2" X 3" (1.27CM X 7.62CM): Brand: CODMAN®

## (undated) DEVICE — SUT PLAIN 1 4/0 1828H

## (undated) DEVICE — GOWN,AURORA,NOREINF,RAGLAN,XL,STERILE: Brand: MEDLINE